# Patient Record
Sex: MALE | Race: WHITE | URBAN - METROPOLITAN AREA
[De-identification: names, ages, dates, MRNs, and addresses within clinical notes are randomized per-mention and may not be internally consistent; named-entity substitution may affect disease eponyms.]

---

## 2018-01-04 ENCOUNTER — TRANSFERRED RECORDS (OUTPATIENT)
Dept: HEALTH INFORMATION MANAGEMENT | Facility: CLINIC | Age: 23
End: 2018-01-04

## 2018-08-17 ENCOUNTER — OFFICE VISIT (OUTPATIENT)
Dept: FAMILY MEDICINE | Facility: CLINIC | Age: 23
End: 2018-08-17
Payer: COMMERCIAL

## 2018-08-17 VITALS
WEIGHT: 188.4 LBS | SYSTOLIC BLOOD PRESSURE: 108 MMHG | TEMPERATURE: 98.2 F | HEART RATE: 79 BPM | DIASTOLIC BLOOD PRESSURE: 75 MMHG | OXYGEN SATURATION: 96 % | RESPIRATION RATE: 16 BRPM

## 2018-08-17 DIAGNOSIS — F64.0 GENDER DYSPHORIA IN ADULT: Primary | ICD-10-CM

## 2018-08-17 LAB
ALBUMIN SERPL-MCNC: 5.1 MG/DL (ref 3.8–5)
ALP SERPL-CCNC: 65.8 U/L (ref 31.7–110.7)
ALT SERPL-CCNC: 24.3 U/L (ref 0–45)
AST SERPL-CCNC: 15.3 U/L (ref 0–55)
BILIRUB SERPL-MCNC: 0.4 MG/DL (ref 0.2–1.3)
BILIRUBIN DIRECT: 0.2 MG/DL (ref 0.1–0.3)
CHOLEST SERPL-MCNC: 191.6 MG/DL (ref 0–200)
CHOLEST/HDLC SERPL: 4.2 {RATIO} (ref 0–5)
GLUCOSE P FAST SERPL-MCNC: 71 MG/DL (ref 51–110)
HDLC SERPL-MCNC: 45.8 MG/DL
HEMOGLOBIN: 16.5 G/DL (ref 13.3–17.7)
LDLC SERPL CALC-MCNC: 109 MG/DL (ref 0–129)
PROT SERPL-MCNC: 8 G/DL (ref 6.8–8.8)
TRIGL SERPL-MCNC: 183.2 MG/DL (ref 0–150)
VLDL CHOLESTEROL: 36.6 MG/DL (ref 7–32)

## 2018-08-17 RX ORDER — LORATADINE 10 MG/1
10 TABLET ORAL DAILY
COMMUNITY

## 2018-08-17 RX ORDER — TESTOSTERONE CYPIONATE 200 MG/ML
100 INJECTION, SOLUTION INTRAMUSCULAR
Qty: 10 ML | Refills: 1 | Status: SHIPPED | OUTPATIENT
Start: 2018-08-17

## 2018-08-17 RX ORDER — TESTOSTERONE CYPIONATE 200 MG/ML
100 INJECTION, SOLUTION INTRAMUSCULAR
COMMUNITY
End: 2018-08-17

## 2018-08-17 ASSESSMENT — ANXIETY QUESTIONNAIRES
1. FEELING NERVOUS, ANXIOUS, OR ON EDGE: SEVERAL DAYS
6. BECOMING EASILY ANNOYED OR IRRITABLE: MORE THAN HALF THE DAYS
2. NOT BEING ABLE TO STOP OR CONTROL WORRYING: NOT AT ALL
5. BEING SO RESTLESS THAT IT IS HARD TO SIT STILL: SEVERAL DAYS
GAD7 TOTAL SCORE: 5
IF YOU CHECKED OFF ANY PROBLEMS ON THIS QUESTIONNAIRE, HOW DIFFICULT HAVE THESE PROBLEMS MADE IT FOR YOU TO DO YOUR WORK, TAKE CARE OF THINGS AT HOME, OR GET ALONG WITH OTHER PEOPLE: SOMEWHAT DIFFICULT
7. FEELING AFRAID AS IF SOMETHING AWFUL MIGHT HAPPEN: NOT AT ALL
3. WORRYING TOO MUCH ABOUT DIFFERENT THINGS: SEVERAL DAYS

## 2018-08-17 ASSESSMENT — PATIENT HEALTH QUESTIONNAIRE - PHQ9: 5. POOR APPETITE OR OVEREATING: NOT AT ALL

## 2018-08-17 NOTE — PROGRESS NOTES
Preceptor Attestation:   Patient seen, evaluated and discussed with the resident. I have verified the content of the note, which accurately reflects my assessment of the patient and the plan of care.   Supervising Physician:  Beatrice Huynh MD

## 2018-08-17 NOTE — PATIENT INSTRUCTIONS
Here is the plan from today's visit    1. Gender dysphoria in adult  Will send results through [employer], please contact us well in advance of needed refill    - Hemoglobin (HGB) ([facility])  - Glucose Fasting ([facility])  - Lipid Cascade ([facility])  - Hepatic Panel ([facility])  - Testosterone total  - testosterone cypionate (DEPOTESTOTERONE) 200 MG/ML injection; Inject 0.5 mLs (100 mg) into the muscle every 14 days  Dispense: 10 mL; Refill: 1    #Education  - Discussed hormone related physical changes d/t   - Will monitor for weight gain  - Will sign up for [employer]  - Thyroid f/u with new PCP once established      Thank you for coming to [facility] today.  Lab Testing:  **If you had lab testing today and your results are reassuring or normal they will be mailed to you or sent through [employer] within 7 days.   **If the lab tests need quick action we will call you with the results.  The phone number we will call with results is # [phone] (home) . If this is not the best number please call our clinic and change the number.  Medication Refills:  If you need any refills please call your pharmacy and they will contact us.   If you need to  your refill at a new pharmacy, please contact the new pharmacy directly. The new pharmacy will help you get your medications transferred faster.   Scheduling:  If you have any concerns about today's visit or wish to schedule another appointment please call our office during normal business hours [phone] (8-5:00 M-F)  If a referral was made to a [facility] and you don't get a call from central scheduling please call [phone].  If a Mammogram was ordered for you at [facility] call [phone] to schedule or change your appointment.  If you had an XRay/CT/Ultrasound/MRI ordered the number is [phone] to schedule or change your radiology appointment.   Medical Concerns:  If you have urgent medical concerns please call [phone] at  any time of the day.    Renae Sandra, DO

## 2018-08-17 NOTE — PROGRESS NOTES
HPI       Sandrita Ray is a 23 year old  who presents for   Chief Complaint   Patient presents with     Consult     HRT- transfer from [place]      Recently moved from [place] to [place]. Has been on masculinizing testosterone (Cypionate 100 mg q 2 weeks IM). Reports feeling good with stable and consistent moods.  Denies tachycardia, sob, nausea, vomit, blurry vision.     Of note, reports 1 year of occasional dizzy spells. Spells last 20-60 seconds and spontaneously self-resolve. In process of establishing new PCP in [place]. Reports no acute concern regarding the dizziness, is clinically stable, and would like to have dizzy spells worked up by new PCP once established. Does not want [facility] provider to work up symptoms today d/t insurance concerns.      Since last visit, patient describes the following symptoms: Weight stable, no hair loss, no skin changes, no constipation, no loose stools    Adherence and Exercise  Medication side effects: no  How often is a medication missed? Less than 1 time per week  Exercise:walking  4-5 days/week for an average of 45-60 minutes     +++++++    Problem, Medication and Allergy Lists were       Current Outpatient Prescriptions   Medication Sig Dispense Refill     loratadine (CLARITIN) 10 MG tablet Take 10 mg by mouth daily       Minoxidil (ROGAINE MENS EX)        testosterone cypionate (DEPOTESTOTERONE) 200 MG/ML injection Inject 0.5 mLs (100 mg) into the muscle every 14 days 10 mL 1     [DISCONTINUED] testosterone cypionate (DEPOTESTOTERONE) 200 MG/ML injection Inject 100 mg into the muscle every 14 days       PMHx  Anxiety/Depression - not currently on meds, does not see a therapist here, no suicidal/homicidal ideations, reports feeling 'very good' over past 2 years on testosterone  GERD -  10 years, took 14 course of OTC omeprazole 20 mg, helped symptoms    New patient - past medical/family/surgical history, meds, and allergies updated and reviewed.         Review of  Systems:   Review of Systems     Skin: negative, acne, rash, hair changes  Eyes: negative, visual blurring, double vision, eye pain  Ears/Nose/Throat: negative, earache, sneezing, vertigo  Respiratory: No shortness of breath, dyspnea on exertion, cough, or hemoptysis  Cardiovascular: negative, palpitations, tachycardia, irregular heart beat and chest pain  Gastrointestinal: negative, nausea and vomiting  Genitourinary: negative, dysuria, urgency and incontinence  Musculoskeletal: negative, back pain and joint swelling  Neurologic: negative, stroke and seizures  Psychiatric: negative, nervous breakdown and thoughts of hurting someone else  Hematologic/Lymphatic/Immunologic: negative, bleeding disorder, chills and fever  Endocrine: negative, hot flashes, night sweats and polydipsia         Physical Exam:     Vitals:    08/17/18 1320 08/17/18 1326   BP: 147/80 108/75   BP Location: Left arm Left arm   Patient Position: Sitting Sitting   Cuff Size: Adult Regular Adult Regular   Pulse: 79    Resp: 16    Temp: 98.2  F (36.8  C)    TempSrc: Oral    SpO2: 96%    Weight: 188 lb 6.4 oz (85.5 kg)      There is no height or weight on file to calculate BMI.  Vitals were reviewed and were normal     Physical Exam   Constitutional: He is oriented to person, place, and time. He appears well-developed and well-nourished. No distress.   HENT:   Head: Normocephalic and atraumatic.   Right Ear: External ear normal.   Neck: Normal range of motion. Neck supple.   Cardiovascular: Normal rate and normal heart sounds.  Exam reveals no gallop.    No murmur heard.  Pulmonary/Chest: Effort normal and breath sounds normal.   Abdominal: Soft.   Musculoskeletal: Normal range of motion.   Neurological: He is alert and oriented to person, place, and time. He has normal reflexes. No cranial nerve deficit.   Skin: Skin is warm and dry. He is not diaphoretic.     Ortho Exam      Results:    Results from this visit  Results for orders placed or  performed in visit on 08/17/18   Hemoglobin (HGB) ([facility])   Result Value Ref Range    Hemoglobin 16.5 13.3 - 17.7 g/dL   Glucose Fasting ([facility])   Result Value Ref Range    Glucose Fasting 71.0 51.0 - 110.0 mg/dL     Assessment and Plan      Sandrita was seen today for consult.    Diagnoses and all orders for this visit:    # Gender dysphoria in adult  Assessment: Trans man (he/him) on 2 years of masculinizing HRT, no recent changes or side effects, last shot 8/4/18 (shot day Friday's)  Plan:  -     Hemoglobin (HGB) ([facility]): to assess blood sugar  -     Glucose Fasting ([facility]): to assess blood sugar  -     Lipid [place] ([facility]): to assess cholesterol   -     Hepatic Panel ([facility]): to assess liver function  -     Testosterone total: For baseline testosterone levels  -  Testosterone Cypionate 100 mg IM q 14 days (in 10 mL vials)  -     Will continue with current dose, and make changes as necessary pending labs    # Education  - Discussed hormone related physical changes d/t   - Will monitor for weight gain  - Will sign up for MyChart  - Thyroid f/u with new PCP once established  - F/u with new PCP regarding dizziness, does not appear to be 2/2 testosterone use  - If patient unable to find new PCP closer, welcomed patient to return for all cares, not just HRT     There are no discontinued medications.    Options for treatment and follow-up care were reviewed with the patient. Sandrita Ray  engaged in the decision making process and verbalized understanding of the options discussed and agreed with the final plan.    George Turner, [employer] Medical Student    I was present with the medical student who participated in the service and in the documentation of this note. I have verified the history and personally performed the physical exam and medical decision making, and have verified the content of the note, which accurately reflects my assessment of the patient and the plan of care.   Renae Sandra,  DO

## 2018-08-17 NOTE — MR AVS SNAPSHOT
After Visit Summary   8/17/2018    Sandrita Ray    MRN: [id number]           Patient Information     Date Of Birth          1995        Visit Information        Provider Department      8/17/2018 2:20 PM Renae Sandra DO [facility]        Today's Diagnoses     Gender dysphoria in adult    -  1      Care Instructions    Here is the plan from today's visit    1. Gender dysphoria in adult  Will send results through [employer], please contact us well in advance of needed refill    - Hemoglobin (HGB) ([facility])  - Glucose Fasting ([facility])  - Lipid Cascade ([facility])  - Hepatic Panel ([facility])  - Testosterone total  - testosterone cypionate (DEPOTESTOTERONE) 200 MG/ML injection; Inject 0.5 mLs (100 mg) into the muscle every 14 days  Dispense: 10 mL; Refill: 1    #Education  - Discussed hormone related physical changes d/t   - Will monitor for weight gain  - Will sign up for [employer]  - Thyroid f/u with new PCP once established      Thank you for coming to [facility] today.  Lab Testing:  **If you had lab testing today and your results are reassuring or normal they will be mailed to you or sent through [employer] within 7 days.   **If the lab tests need quick action we will call you with the results.  The phone number we will call with results is # [phone] (home) . If this is not the best number please call our clinic and change the number.  Medication Refills:  If you need any refills please call your pharmacy and they will contact us.   If you need to  your refill at a new pharmacy, please contact the new pharmacy directly. The new pharmacy will help you get your medications transferred faster.   Scheduling:  If you have any concerns about today's visit or wish to schedule another appointment please call our office during normal business hours [phone] (8-5:00 M-F)  If a referral was made to a [facility] and you don't get a call from  central scheduling please call [phone].  If a Mammogram was ordered for you at [facility] call [phone] to schedule or change your appointment.  If you had an XRay/CT/Ultrasound/MRI ordered the number is [phone] to schedule or change your radiology appointment.   Medical Concerns:  If you have urgent medical concerns please call [phone] at any time of the day.    Renae Sandra, DO            Follow-ups after your visit        Who to contact     Please call your clinic at [phone] to:    Ask questions about your health    Make or cancel appointments    Discuss your medicines    Learn about your test results    Speak to your doctor            Additional Information About Your Visit        [employer] Information     [employer] is an electronic gateway that provides easy, online access to your medical records. With [employer], you can request a clinic appointment, read your test results, renew a prescription or communicate with your care team.     To sign up for [employer] visit the website at [email or web address]   You will be asked to enter the access code listed below, as well as some personal information. Please follow the directions to create your username and password.     Your access code is: [email or web address]  Expires: 11/15/2018 12:46 PM     Your access code will  in 90 days. If you need help or a new code, please contact your [facility] or call [phone] for assistance.        Care EveryWhere ID     This is your Care EveryWhere ID. This could be used by other organizations to access your [facility] medical records  [id number]        Your Vitals Were     Pulse Temperature Respirations Pulse Oximetry          79 98.2  F (36.8  C) (Oral) 16 96%         Blood Pressure from Last 3 Encounters:   18 108/75    Weight from Last 3 Encounters:   18 188 lb 6.4 oz (85.5 kg)              We Performed the Following     Glucose Fasting ([facility])      Hemoglobin (HGB) ([facility])     Hepatic Panel ([facility])     Lipid Cascade ([facility])     Testosterone total          Where to get your medicines      Some of these will need a paper prescription and others can be bought over the counter.  Ask your nurse if you have questions.     Bring a paper prescription for each of these medications     testosterone cypionate 200 MG/ML injection          Primary Care Provider Fax #    Physician No Ref-Primary [phone]       No address on file        Equal Access to Services     CRUZITO SPENCE : Hadii carol ku hadasho Soomaali, waaxda luqadaha, qaybta kaalmada adeegyada, jason monsalvein haypilo scottsawyerole alejo . So [facility] [phone].    ATENCIÓN: Si nicolas glass, tiene a smith disposición servicios gratuitos de asistencia lingüística. Llame al [phone].    We comply with applicable federal civil rights laws and [place] laws. We do not discriminate on the basis of race, color, national origin, age, disability, sex, sexual orientation, or gender identity.            Thank you!     Thank you for choosing [facility]  for your care. Our goal is always to provide you with excellent care. Hearing back from our patients is one way we can continue to improve our services. Please take a few minutes to complete the written survey that you may receive in the mail after your visit with us. Thank you!             Your Updated Medication List - Protect others around you: Learn how to safely use, store and throw away your medicines at www.disposemymeds.org.          This list is accurate as of 8/17/18  2:38 PM.  Always use your most recent med list.                   Brand Name Dispense Instructions for use Diagnosis    loratadine 10 MG tablet    CLARITIN     Take 10 mg by mouth daily        ROGAINE MENS EX           testosterone cypionate 200 MG/ML injection    DEPOTESTOTERONE    10 mL    Inject 0.5 mLs (100 mg) into the muscle every 14 days    Gender dysphoria in  adult

## 2018-08-20 PROBLEM — F64.0 GENDER DYSPHORIA IN ADULT: Status: ACTIVE | Noted: 2018-08-20

## 2018-08-21 ENCOUNTER — TELEPHONE (OUTPATIENT)
Dept: FAMILY MEDICINE | Facility: CLINIC | Age: 23
End: 2018-08-21

## 2018-08-21 LAB — TESTOST SERPL-MCNC: 170 NG/DL (ref 240–950)

## 2018-08-21 NOTE — TELEPHONE ENCOUNTER
Dr. Sandra the TESTOSTERONE CYPIONATE is not covered by insurance, please let us know if you'd like us to start a PA.     Thank you,     ODALIS Lazaro 1:43 PM August 21, 2018

## 2018-08-22 ASSESSMENT — ANXIETY QUESTIONNAIRES: GAD7 TOTAL SCORE: 5

## 2018-08-22 ASSESSMENT — PATIENT HEALTH QUESTIONNAIRE - PHQ9: SUM OF ALL RESPONSES TO PHQ QUESTIONS 1-9: 10

## 2018-08-22 NOTE — TELEPHONE ENCOUNTER
Yes, please start the process for prior authorization.    Renae Sandra, DO  PGY2 Family Medicine Resident  Pager: [phone]

## 2018-08-27 NOTE — TELEPHONE ENCOUNTER
Central Prior Authorization Team   Phone: [phone]      Prior Authorization Not Needed per Pharmacy    Medication: TESTOSTERONE CYPIONATE 200MG/ML-PA Not Needed  Insurance Company:    Expected CoPay:      Pharmacy Filling the Rx: [facility] [address] [place] [address] [address] [facility] [address]  Pharmacy Notified: Yes  Patient Notified: No    Called pharmacy for patient's insurance information to start PA but was told that medication went through insurance fine. Pharmacy ok to disregard request.

## 2020-03-11 ENCOUNTER — HEALTH MAINTENANCE LETTER (OUTPATIENT)
Age: 25
End: 2020-03-11

## 2021-01-03 ENCOUNTER — HEALTH MAINTENANCE LETTER (OUTPATIENT)
Age: 26
End: 2021-01-03

## 2021-04-25 ENCOUNTER — HEALTH MAINTENANCE LETTER (OUTPATIENT)
Age: 26
End: 2021-04-25

## 2021-10-10 ENCOUNTER — HEALTH MAINTENANCE LETTER (OUTPATIENT)
Age: 26
End: 2021-10-10

## 2022-05-21 ENCOUNTER — HEALTH MAINTENANCE LETTER (OUTPATIENT)
Age: 27
End: 2022-05-21

## 2022-09-18 ENCOUNTER — HEALTH MAINTENANCE LETTER (OUTPATIENT)
Age: 27
End: 2022-09-18

## 2023-06-04 ENCOUNTER — HEALTH MAINTENANCE LETTER (OUTPATIENT)
Age: 28
End: 2023-06-04